# Patient Record
(demographics unavailable — no encounter records)

---

## 2025-01-10 NOTE — DISCUSSION/SUMMARY
[FreeTextEntry1] : 67 y.o. M - former patient of Dr. Jazmín martínez. Former Smoker, Obesity, HTN, HL, Low HDL (Metabolic Syndrome), Mild valvular insufficiency CAD s/p PCI LAD 2001  - p/f evaluation ------------------ TTE --  - LVEF  Nl; Mild to Mod AI TTE - 6-2021 - LVEF  Nl; GII Diastolic  TTE -  - LVEF  Nl TTE - 2/2018 - Normal ----------------------- 1. CAD/HL - Continue ASA - Can stop Plavix - Continue Vytorin - check labs  2. HTN/Metabolic syndrome characteristics - Controlled - Trial off ramipril because of erectile dysfunction  - ? Metformin  - Check A1c  3. Obesity/BMI 34 - Counseled on wt loss  4. Mild AoValvular Insufficiency - tte checked  Cholesterol management - Assessed - Impression is active; changes as per above - Discussed diet and exercise at length - Any lifestyle/medication changes as per above   Risk factors for cardiomyopathy - Assessed - Impression is stable - Reviewed records from PCP   Cardiac Health optimization - Discussed diet and exercise at length - Discussed importance of monitoring and re-assessment of cardiac health on further visits

## 2025-01-10 NOTE — PHYSICAL EXAM
[Normal] : clear lung fields, good air entry, no respiratory distress [General Appearance - Well Developed] : well developed [Normal Appearance] : normal appearance [Well Groomed] : well groomed [General Appearance - Well Nourished] : well nourished [No Deformities] : no deformities [General Appearance - In No Acute Distress] : no acute distress [Normal Conjunctiva] : the conjunctiva exhibited no abnormalities [Eyelids - No Xanthelasma] : the eyelids demonstrated no xanthelasmas [Normal Oral Mucosa] : normal oral mucosa [No Oral Pallor] : no oral pallor [No Oral Cyanosis] : no oral cyanosis [Normal Jugular Venous A Waves Present] : normal jugular venous A waves present [Normal Jugular Venous V Waves Present] : normal jugular venous V waves present [No Jugular Venous Rico A Waves] : no jugular venous rico A waves [Respiration, Rhythm And Depth] : normal respiratory rhythm and effort [Exaggerated Use Of Accessory Muscles For Inspiration] : no accessory muscle use [Auscultation Breath Sounds / Voice Sounds] : lungs were clear to auscultation bilaterally [Heart Rate And Rhythm] : heart rate and rhythm were normal [Heart Sounds] : normal S1 and S2 [Murmurs] : no murmurs present [Abdomen Soft] : soft [Abdomen Tenderness] : non-tender [Abdomen Mass (___ Cm)] : no abdominal mass palpated [Abnormal Walk] : normal gait [Gait - Sufficient For Exercise Testing] : the gait was sufficient for exercise testing [Cyanosis, Localized] : no localized cyanosis [Nail Clubbing] : no clubbing of the fingernails [Petechial Hemorrhages (___cm)] : no petechial hemorrhages [Skin Color & Pigmentation] : normal skin color and pigmentation [] : no rash [No Venous Stasis] : no venous stasis [Skin Lesions] : no skin lesions [No Skin Ulcers] : no skin ulcer [No Xanthoma] : no  xanthoma was observed [Oriented To Time, Place, And Person] : oriented to person, place, and time [Affect] : the affect was normal [Mood] : the mood was normal [No Anxiety] : not feeling anxious [FreeTextEntry1] : +obese

## 2025-01-10 NOTE — PHYSICAL EXAM
[Normal] : clear lung fields, good air entry, no respiratory distress [General Appearance - Well Developed] : well developed [Normal Appearance] : normal appearance [Well Groomed] : well groomed [General Appearance - Well Nourished] : well nourished [No Deformities] : no deformities [General Appearance - In No Acute Distress] : no acute distress [Normal Conjunctiva] : the conjunctiva exhibited no abnormalities [Eyelids - No Xanthelasma] : the eyelids demonstrated no xanthelasmas [Normal Oral Mucosa] : normal oral mucosa [No Oral Pallor] : no oral pallor [No Oral Cyanosis] : no oral cyanosis [Normal Jugular Venous A Waves Present] : normal jugular venous A waves present [Normal Jugular Venous V Waves Present] : normal jugular venous V waves present [No Jugular Venous Rico A Waves] : no jugular venous rico A waves [Respiration, Rhythm And Depth] : normal respiratory rhythm and effort [Exaggerated Use Of Accessory Muscles For Inspiration] : no accessory muscle use [Auscultation Breath Sounds / Voice Sounds] : lungs were clear to auscultation bilaterally [Heart Rate And Rhythm] : heart rate and rhythm were normal [Heart Sounds] : normal S1 and S2 [Murmurs] : no murmurs present [Abdomen Soft] : soft [Abdomen Tenderness] : non-tender [Abdomen Mass (___ Cm)] : no abdominal mass palpated [Abnormal Walk] : normal gait [Gait - Sufficient For Exercise Testing] : the gait was sufficient for exercise testing [Cyanosis, Localized] : no localized cyanosis [Nail Clubbing] : no clubbing of the fingernails [Petechial Hemorrhages (___cm)] : no petechial hemorrhages [Skin Color & Pigmentation] : normal skin color and pigmentation [] : no rash [No Venous Stasis] : no venous stasis [Skin Lesions] : no skin lesions [No Skin Ulcers] : no skin ulcer [No Xanthoma] : no  xanthoma was observed [Oriented To Time, Place, And Person] : oriented to person, place, and time [Mood] : the mood was normal [Affect] : the affect was normal [No Anxiety] : not feeling anxious [FreeTextEntry1] : +obese

## 2025-01-10 NOTE — HISTORY OF PRESENT ILLNESS
[FreeTextEntry1] : 66 y.o. 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 Former Smoker, Obesity, HTN, HL, Low HDL (Metabolic Syndrome), Mild valvular insufficiency CAD s/p PCI LAD   - p/f evaluation ------------------- ------------------- ============= ============= 2017 Patient reports reduced ET for many many yrs following a back surgery.  No definite exercise schedule.  Wt down over last year.  Portion control has been effective.  Patient reports no CP/SOB/Palpitations/Orthopnea/LE Edema/PND/Syncope/Pre-syncope ------------------------ 10/2017 Stopped plavix on last visit. Stopped Ramipril (Altase). Recent wt gain with holidays.  No use of R leg from dropped foot.  ------------------------- 2018 Doing well. Wt still p.  Hypertension/Hyperlipidemia/Coronary Artery Disease - controlled on current treatment, No CP/SOB/Palps/LE Edema/Orthopnea attributable to these medical issues. Labs reviewed in EMR. ------------------- 2020 TTE -  - LVEF  Nl + 8 lbs. -------------------  Still overweight.   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  -------------------- TTE -  - LVEF  Nl; GII Diastolic  ----------------- -----------------  Wants to hold off Ozempic Anti meds --------------------  TTE --  - LVEF  Nl; Mild to Mod AI Start zepbound  ----------------- -----------------  On Mounjaro 2.5  - 14 lbs ----------------- -----------------  Staying at 2.5 as per his request  ----------------- -----------------  Wants to hold for 1 month and re-start to see if improved ----------------- -----------------  Maintaining wt off medication Remain off of it ----------------- -----------------  CC: Weight and Heart Health - Notes No CP/SOB/Palps/Leg swelling - Reports No F/C/N/V/Headaches - Reports Normal Exercise Tolerance - Reports no medication changes - Reports normal mood/quality of life - Reports no diet changes - Reports no body aches - Reports no recent colds/viruse - Recent labs/imaging reviewed - Relevant family history reviewed - Mother/Father - CV Mounj 5 for 2 months - goal 200   ------------------ Data 2016 Cr 0.97 LFTs nl (except slightly elevated Glucose)   Lipid: 2016 TCHol: 151 T HDL: 34 LDL 77   Carotid US: Plaque but no stenosis   TTE:  Mild MR, Mild AI, nl LV/RV function

## 2025-02-04 NOTE — HISTORY OF PRESENT ILLNESS
[5] : 5 [de-identified] : 2/4/25 73 y.o male is here for left knee pain today. States he fell 2 weeks ago on a sat and sun outside his house. States no prior tx or imaging has been done. Fell on left knee

## 2025-02-04 NOTE — IMAGING
[de-identified] : LEFT KNEE EXAM Alignment: Neutral  Effusion: None Atrophy: None                                                  Stable to Varus/valgus stress Posterior Drawer Test: negative Anterior Drawer Test: Negative Knee Extension/Flexion: 0 / 120  Medial/lateral compartments Medial joint line: No Tenderness Lateral joint line: No Tenderness Tito test: negative  Patellofemoral joint Medial patellar facet: no tenderness Patellar grind: Negative  Tendons: Pes Anserine: No tenderness Gerdys Tubercle/ IT Band: No tenderness Quadriceps Tendon: No Tenderness patellar tendon: POS Tenderness Tibial tubercle: not tenderness Calf: no Tenderness  Neurovascular exam Muscle strength: 5/5 Sensation to light touch: intact Distal pulses: 2+  [id number] Xrays of the Left Knee were taken demonstrating mild tricompartmental OA no signs of fractures, dislocations,or significant arthritis.

## 2025-02-04 NOTE — ASSESSMENT
[FreeTextEntry1] : 73 year M with left knee patellar tendonitis bracing for patellar tendon PT for patella tendonitis 
Physical therapy

## 2025-03-13 NOTE — PHYSICAL EXAM
[Normal] : clear lung fields, good air entry, no respiratory distress [General Appearance - Well Developed] : well developed [Normal Appearance] : normal appearance [Well Groomed] : well groomed [General Appearance - Well Nourished] : well nourished [No Deformities] : no deformities [General Appearance - In No Acute Distress] : no acute distress [Normal Conjunctiva] : the conjunctiva exhibited no abnormalities [Eyelids - No Xanthelasma] : the eyelids demonstrated no xanthelasmas [Normal Oral Mucosa] : normal oral mucosa [No Oral Pallor] : no oral pallor [No Oral Cyanosis] : no oral cyanosis [Normal Jugular Venous A Waves Present] : normal jugular venous A waves present [Normal Jugular Venous V Waves Present] : normal jugular venous V waves present [No Jugular Venous Rico A Waves] : no jugular venous rico A waves [Respiration, Rhythm And Depth] : normal respiratory rhythm and effort [Exaggerated Use Of Accessory Muscles For Inspiration] : no accessory muscle use [Auscultation Breath Sounds / Voice Sounds] : lungs were clear to auscultation bilaterally [Heart Rate And Rhythm] : heart rate and rhythm were normal [Heart Sounds] : normal S1 and S2 [Murmurs] : no murmurs present [Abdomen Soft] : soft [Abdomen Tenderness] : non-tender [Abdomen Mass (___ Cm)] : no abdominal mass palpated [Abnormal Walk] : normal gait [Gait - Sufficient For Exercise Testing] : the gait was sufficient for exercise testing [Nail Clubbing] : no clubbing of the fingernails [Cyanosis, Localized] : no localized cyanosis [Petechial Hemorrhages (___cm)] : no petechial hemorrhages [Skin Color & Pigmentation] : normal skin color and pigmentation [] : no rash [No Venous Stasis] : no venous stasis [No Skin Ulcers] : no skin ulcer [Skin Lesions] : no skin lesions [No Xanthoma] : no  xanthoma was observed [Oriented To Time, Place, And Person] : oriented to person, place, and time [Affect] : the affect was normal [Mood] : the mood was normal [No Anxiety] : not feeling anxious [FreeTextEntry1] : +obese

## 2025-03-13 NOTE — HISTORY OF PRESENT ILLNESS
[FreeTextEntry1] : 66 y.o. 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 Former Smoker, Obesity, HTN, HL, Low HDL (Metabolic Syndrome), Mild valvular insufficiency CAD s/p PCI LAD   - p/f evaluation ------------------- ------------------- ============= ============= 2017 Patient reports reduced ET for many many yrs following a back surgery.  No definite exercise schedule.  Wt down over last year.  Portion control has been effective.  Patient reports no CP/SOB/Palpitations/Orthopnea/LE Edema/PND/Syncope/Pre-syncope ------------------------ 10/2017 Stopped plavix on last visit. Stopped Ramipril (Altase). Recent wt gain with holidays.  No use of R leg from dropped foot.  ------------------------- 2018 Doing well. Wt still p.  Hypertension/Hyperlipidemia/Coronary Artery Disease - controlled on current treatment, No CP/SOB/Palps/LE Edema/Orthopnea attributable to these medical issues. Labs reviewed in EMR. ------------------- 2020 TTE -  - LVEF  Nl + 8 lbs. -------------------  Still overweight.   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  -------------------- TTE -  - LVEF  Nl; GII Diastolic  ----------------- -----------------  Wants to hold off Ozempic Anti meds --------------------  TTE --  - LVEF  Nl; Mild to Mod AI Start zepbound  ----------------- -----------------  On Mounjaro 2.5  - 14 lbs ----------------- -----------------  Staying at 2.5 as per his request  ----------------- -----------------  Wants to hold for 1 month and re-start to see if improved ----------------- -----------------  Maintaining wt off medication Remain off of it ----------------- ----------------- ----------------- ----------------- 3-2025 CC: Heart Issues - Patient reports no chest pain, no localization to the sternum, no radiation to the neck/jaw, no alleviating nor worsening precipitants to CP, no assoc symptoms to CP no alleviating nor worsening precipitants to CP, no assoc symptoms to CP - Patient notes no associated SOB/Palps/Leg swelling - Reports No associated F/C/N/V/Headaches - Reports Normal Exercise Tolerance - Reports no medication changes - Reports normal mood/quality of life - Reports no associated midnight awakenings from cP - Reports no diet changes - Reports no associated body aches - Reports no recent colds/viruses - Recent labs/imaging reviewed - Relevant Family history reviewed Mother/Father - CV Risk Assessment for 10 Year ACC/AHA Pooled Risk Cohort Equation places this person at < 7.5% Risk of ASCVD Mounj 5 for 2 months - goal 200    ------------------ Data 2016 Cr 0.97 LFTs nl (except slightly elevated Glucose)   Lipid: 2016 TCHol: 151 T HDL: 34 LDL 77   Carotid US: Plaque but no stenosis   TTE:  Mild MR, Mild AI, nl LV/RV function

## 2025-07-07 NOTE — HISTORY OF PRESENT ILLNESS
[FreeTextEntry1] : 66 y.o. 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 Former Smoker, Obesity, HTN, HL, Low HDL (Metabolic Syndrome), Mild valvular insufficiency CAD s/p PCI LAD   - p/f evaluation ------------------- ------------------- ============= ============= 2017 Patient reports reduced ET for many many yrs following a back surgery.  No definite exercise schedule.  Wt down over last year.  Portion control has been effective.  Patient reports no CP/SOB/Palpitations/Orthopnea/LE Edema/PND/Syncope/Pre-syncope ------------------------ 10/2017 Stopped plavix on last visit. Stopped Ramipril (Altase). Recent wt gain with holidays.  No use of R leg from dropped foot.  ------------------------- 2018 Doing well. Wt still p.  Hypertension/Hyperlipidemia/Coronary Artery Disease - controlled on current treatment, No CP/SOB/Palps/LE Edema/Orthopnea attributable to these medical issues. Labs reviewed in EMR. ------------------- 2020 TTE -  - LVEF  Nl + 8 lbs. -------------------  Still overweight.   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  -------------------- TTE -  - LVEF  Nl; GII Diastolic  ----------------- -----------------  Wants to hold off Ozempic Anti meds --------------------  TTE --  - LVEF  Nl; Mild to Mod AI Start zepbound  ----------------- -----------------  On Mounjaro 2.5  - 14 lbs ----------------- -----------------  Staying at 2.5 as per his request  ----------------- -----------------  Wants to hold for 1 month and re-start to see if improved ----------------- -----------------  Maintaining wt off medication Remain off of it ----------------- ----------------- ---------------------------------------------------------------------------- ----------------------------------------------------------------------------  CC: Heart Issues - Patient reports no chest pain, no localization to the sternum, no radiation to the neck/jaw, no alleviating nor worsening precipitants to CP, no assoc symptoms to CP - Patient notes no associated SOB/Palps/Leg swelling - Reports No associated F/C/N/V/Headaches - Reports Normal Exercise Tolerance - Reports no medication changes - Reports normal mood/quality of life - Reports no associated midnight awakenings from cP - Reports no diet changes - Reports no associated body aches- Reports no recent colds/viruses- Recent labs/imaging reviewed- Relevant Family history reviewed Mother/Father - CVRisk Assessment for 10 Year ACC/AHA Pooled Risk Cohort Equation places this person at < 7.5% Risk of ASCVD Mounj 5 for 2 months - goal 200  -> Incr to 7.5     ------------------ Data 2016 Cr 0.97 LFTs nl (except slightly elevated Glucose)   Lipid: 2016 TCHol: 151 T HDL: 34 LDL 77   Carotid US: Plaque but no stenosis   TTE:  Mild MR, Mild AI, nl LV/RV function

## 2025-07-07 NOTE — PHYSICAL EXAM
[Normal] : clear lung fields, good air entry, no respiratory distress [General Appearance - Well Developed] : well developed [Normal Appearance] : normal appearance [Well Groomed] : well groomed [General Appearance - Well Nourished] : well nourished [No Deformities] : no deformities [General Appearance - In No Acute Distress] : no acute distress [Eyelids - No Xanthelasma] : the eyelids demonstrated no xanthelasmas [Normal Oral Mucosa] : normal oral mucosa [No Oral Pallor] : no oral pallor [No Oral Cyanosis] : no oral cyanosis [Normal Jugular Venous A Waves Present] : normal jugular venous A waves present [Normal Jugular Venous V Waves Present] : normal jugular venous V waves present [No Jugular Venous Rico A Waves] : no jugular venous rico A waves [Respiration, Rhythm And Depth] : normal respiratory rhythm and effort [Exaggerated Use Of Accessory Muscles For Inspiration] : no accessory muscle use [Auscultation Breath Sounds / Voice Sounds] : lungs were clear to auscultation bilaterally [Heart Rate And Rhythm] : heart rate and rhythm were normal [Heart Sounds] : normal S1 and S2 [Murmurs] : no murmurs present [Abdomen Soft] : soft [Abdomen Tenderness] : non-tender [Abdomen Mass (___ Cm)] : no abdominal mass palpated [Abnormal Walk] : normal gait [Gait - Sufficient For Exercise Testing] : the gait was sufficient for exercise testing [Nail Clubbing] : no clubbing of the fingernails [Cyanosis, Localized] : no localized cyanosis [Petechial Hemorrhages (___cm)] : no petechial hemorrhages [Skin Color & Pigmentation] : normal skin color and pigmentation [] : no rash [No Venous Stasis] : no venous stasis [Skin Lesions] : no skin lesions [No Skin Ulcers] : no skin ulcer [No Xanthoma] : no  xanthoma was observed [Oriented To Time, Place, And Person] : oriented to person, place, and time [Affect] : the affect was normal [Mood] : the mood was normal [No Anxiety] : not feeling anxious [Well Developed] : well developed [Well Nourished] : well nourished [No Acute Distress] : no acute distress [Normal Conjunctiva] : normal conjunctiva [Normal Venous Pressure] : normal venous pressure [No Carotid Bruit] : no carotid bruit [Normal S1, S2] : normal S1, S2 [No Murmur] : no murmur [No Rub] : no rub [No Gallop] : no gallop [Clear Lung Fields] : clear lung fields [Good Air Entry] : good air entry [No Respiratory Distress] : no respiratory distress  [Soft] : abdomen soft [Non Tender] : non-tender [No Masses/organomegaly] : no masses/organomegaly [Normal Bowel Sounds] : normal bowel sounds [Normal Gait] : normal gait [No Edema] : no edema [No Cyanosis] : no cyanosis [No Clubbing] : no clubbing [No Varicosities] : no varicosities [No Rash] : no rash [No Skin Lesions] : no skin lesions [Moves all extremities] : moves all extremities [No Focal Deficits] : no focal deficits [Normal Speech] : normal speech [Alert and Oriented] : alert and oriented [Normal memory] : normal memory [FreeTextEntry1] : +obese

## 2025-07-07 NOTE — DISCUSSION/SUMMARY
[FreeTextEntry1] : 67 y.o. M - former patient of Dr. Noyola - [place]. Former Smoker, Obesity, HTN, HL, Low HDL (Metabolic Syndrome), Mild valvular insufficiency CAD s/p PCI LAD 2001  - p/f evaluation ------------------ TTE --  - LVEF  Nl; Mild to Mod AI TTE - 6-2021 - LVEF  Nl; GII Diastolic  TTE -  - LVEF  Nl TTE - 2/2018 - Normal ----------------------- 1. CAD/HL - Continue ASA - Can stop Plavix - Continue Vytorin - check labs  2. HTN/Metabolic syndrome characteristics - Controlled - Trial off ramipril because of erectile dysfunction  - ? Metformin  - Check A1c  3. Obesity/BMI 34 - Counseled on wt loss  4. Mild AoValvular Insufficiency - tte checked  Cholesterol management - Assessed - Impression is active; changes as per above - Discussed diet and exercise at length - Any lifestyle/medication changes as per above   Risk factors for cardiomyopathy - Assessed - Impression is stable - Reviewed records from PCP   Cardiac Health optimization - Discussed diet and exercise at length - Discussed importance of monitoring and re-assessment of cardiac health on further visits                  I have spent 40 minutes on the patient encounter including explaining and formulating rationale for treatment plan. >50% of time spent in direct counseling reviewing all tests, labs, and imaging and conferring with patient, family member, and other physicians regarding patient care.  Time Spent excludes teaching and other separately reported billable services.                                     -                  -                                -    [EKG obtained to assist in diagnosis and management of assessed problem(s)] : EKG obtained to assist in diagnosis and management of assessed problem(s)